# Patient Record
Sex: MALE | NOT HISPANIC OR LATINO | Employment: FULL TIME | URBAN - METROPOLITAN AREA
[De-identification: names, ages, dates, MRNs, and addresses within clinical notes are randomized per-mention and may not be internally consistent; named-entity substitution may affect disease eponyms.]

---

## 2018-11-06 ENCOUNTER — OFFICE VISIT (OUTPATIENT)
Dept: ORTHOPEDIC SURGERY | Facility: CLINIC | Age: 43
End: 2018-11-06

## 2018-11-06 VITALS — TEMPERATURE: 98.1 F | BODY MASS INDEX: 31.68 KG/M2 | WEIGHT: 178.8 LBS | HEIGHT: 63 IN

## 2018-11-06 DIAGNOSIS — S86.911A KNEE STRAIN, RIGHT, INITIAL ENCOUNTER: ICD-10-CM

## 2018-11-06 DIAGNOSIS — M25.561 ACUTE PAIN OF RIGHT KNEE: ICD-10-CM

## 2018-11-06 DIAGNOSIS — S89.91XA RIGHT KNEE INJURY, INITIAL ENCOUNTER: Primary | ICD-10-CM

## 2018-11-06 PROCEDURE — 99243 OFF/OP CNSLTJ NEW/EST LOW 30: CPT | Performed by: ORTHOPAEDIC SURGERY

## 2018-11-06 PROCEDURE — 73562 X-RAY EXAM OF KNEE 3: CPT | Performed by: ORTHOPAEDIC SURGERY

## 2018-11-06 NOTE — PROGRESS NOTES
Patient Name: Brandan Jang   YOB: 1975  Referring Primary Care Physician: Anabel Slater MD  BMI: Body mass index is 31.27 kg/m².    Chief Complaint:    Chief Complaint   Patient presents with   • Right Leg - Establish Care, Pain        Subjective:    HPI:   Brandan Jang is a pleasant 43 y.o. year old who presents today for evaluation of   Chief Complaint   Patient presents with   • Right Leg - Establish Care, Pain    (Location). Duration: This has been going on for weeks. Timing: The pain is acute. and but improving.  limped for 2 weeks.   Context or causative factors: after moving a bunch of furniture.  Remote injury 8 yrs ago moving couch.  Relieving Factors:  In the past has tried ice/heat/rest.     This problem is new to this examiner.     Medications:   Home Medications:  No current outpatient prescriptions on file prior to visit.     No current facility-administered medications on file prior to visit.      Current Medications:  Scheduled Meds:  Continuous Infusions:  No current facility-administered medications for this visit.   PRN Meds:.    I have reviewed the patient's medical history in detail and updated the computerized patient record.  Review and summarization of old records includes:    History reviewed. No pertinent past medical history.     Past Surgical History:   Procedure Laterality Date   • TONSILLECTOMY          Social History     Occupational History   • Not on file.     Social History Main Topics   • Smoking status: Never Smoker   • Smokeless tobacco: Never Used   • Alcohol use No   • Drug use: No   • Sexual activity: Defer    Social History     Social History Narrative   • No narrative on file        Family History   Problem Relation Age of Onset   • No Known Problems Mother    • Clotting disorder Father    • No Known Problems Sister    • No Known Problems Brother    • Cancer Maternal Aunt         breast   • No Known Problems Maternal Uncle    • No Known Problems Paternal  "Aunt    • No Known Problems Paternal Uncle    • No Known Problems Maternal Grandmother    • No Known Problems Maternal Grandfather    • No Known Problems Paternal Grandmother    • No Known Problems Paternal Grandfather    • Anesthesia problems Neg Hx    • Broken bones Neg Hx    • Collagen disease Neg Hx    • Diabetes Neg Hx    • Dislocations Neg Hx    • Osteoporosis Neg Hx    • Rheumatologic disease Neg Hx    • Scoliosis Neg Hx    • Severe sprains Neg Hx        ROS: 14 point review of systems was performed and all other systems were reviewed and are negative except for documented findings in HPI and today's encounter.     Allergies: No Known Allergies  Constitutional:  Denies fever, shaking or chills   Eyes:  Denies change in visual acuity   HENT:  Denies nasal congestion or sore throat   Respiratory:  Denies cough or shortness of breath   Cardiovascular:  Denies chest pain or severe LE edema   GI:  Denies abdominal pain, nausea, vomiting, bloody stools or diarrhea   Musculoskeletal:  Numbness, tingling, pain, or loss of motor function only as noted above in history of present illness.  : Denies painful urination or hematuria  Integument:  Denies rash, lesion or ulceration   Neurologic:  Denies headache or focal weakness  Endocrine:  Denies lymphadenopathy  Psych:  Denies confusion or change in mental status   Hem:  Denies active bleeding    Subjective     Objective:    Physical Exam: 43 y.o. male  Wt Readings from Last 3 Encounters:   11/06/18 81.1 kg (178 lb 12.8 oz)   03/30/15 84.4 kg (186 lb)   07/17/14 82.1 kg (181 lb)     Ht Readings from Last 3 Encounters:   11/06/18 161 cm (63.4\")   03/30/15 170.2 cm (67\")   07/17/14 170.2 cm (67\")     Body mass index is 31.27 kg/m².    Vitals:    11/06/18 0853   Temp: 98.1 °F (36.7 °C)       Vital signs reviewed.   General Appearance:    Alert, cooperative, in no acute distress                  Eyes: conjunctiva clear  ENT: external ears and nose atraumatic  CV: no " peripheral edema  Resp: normal respiratory effort  Skin: no rashes or wounds; normal turgor  Psych: mood and affect appropriate  Lymph: no nodes appreciated  Neuro: gross sensation intact  Vascular:  Palpable peripheral pulse in noted extremity  Musculoskeletal Extremities: mild knee swelling and crep, mcm neg, lig exam not suggestive, no locking      Radiology:   Imaging done today and discussed at length with the patient:    Indication: pain related symptoms,  Views: 3V AP, LAT & 40 degree PA right knee(s)   Findings: moderate arthritis  Comparison views: not available      Assessment:     ICD-10-CM ICD-9-CM   1. Right knee injury, initial encounter S89.91XA 959.7   2. Acute pain of right knee M25.561 719.46   3. Knee strain, right, initial encounter S86.911A 844.9        Procedures       Plan: Biomechanics of pertinent body area discussed.  Risks, benefits, alternatives, comparisons, and complications of accepted medicines, injections, recommendations, surgical procedures, and therapies explained and education provided in laymen's terms. The patient was given the opportunity to ask questions and they were answerved to their satisfaction.   Natural history and expected course of this patient's diagnosis discussed along with evaluation of therapies. Questions answered.  OTC analgesics as needed with dosage warning and instructions given: OTC meds: Ibuprofen  This Consult is done at the request of this patient's PCP (as listed at the top of this note)  to whom I will send this report with this rendered opinion.      11/6/2018

## 2024-02-06 ENCOUNTER — OFFICE VISIT (OUTPATIENT)
Dept: INTERNAL MEDICINE | Facility: CLINIC | Age: 49
End: 2024-02-06
Payer: COMMERCIAL

## 2024-02-06 VITALS
HEART RATE: 70 BPM | BODY MASS INDEX: 23.54 KG/M2 | TEMPERATURE: 98 F | WEIGHT: 146.5 LBS | SYSTOLIC BLOOD PRESSURE: 122 MMHG | OXYGEN SATURATION: 99 % | HEIGHT: 66 IN | DIASTOLIC BLOOD PRESSURE: 78 MMHG

## 2024-02-06 DIAGNOSIS — I34.1 MITRAL VALVE PROLAPSE: ICD-10-CM

## 2024-02-06 DIAGNOSIS — F32.0 CURRENT MILD EPISODE OF MAJOR DEPRESSIVE DISORDER WITHOUT PRIOR EPISODE: Primary | ICD-10-CM

## 2024-02-06 PROCEDURE — 99214 OFFICE O/P EST MOD 30 MIN: CPT | Performed by: NURSE PRACTITIONER

## 2024-02-06 RX ORDER — FERROUS GLUCONATE 324(37.5)
TABLET ORAL
COMMUNITY

## 2024-02-06 NOTE — PROGRESS NOTES
"Subjective   Brandan Jang is a 48 y.o. male. Patient is here today for   Chief Complaint   Patient presents with    Anxiety    Depression   .    History of Present Illness   New patient here to establish care.  Health history and questionnaire have been reviewed in its entirety. The patient's previous primary care provider was Dr. Mccollum ([facility]). Dr. Franklin Zamudio. ([facility])    Patient has allergic rhinitis. He gets allergy injections with [facility].  He also has iron deficiency anemia and is taking an iron supplement.    Patient is having some depression and anxiety. He was started on lexapro and started having racing thoughts, low libido. He had been on wellbutrin in the past and felt hyper.  He has negative thoughts, but is not suicidal. Exercise helps. Going through divorce. Sleeping okay.     C/o hearing a \"squeaky sound\" when his heart rate is elevated. It goes away when his heart rate improves. This has been for about 8-9 years. States that he had a scan of his heart and was told that he had a leaky valve.     The following portions of the patient's history were reviewed and updated as appropriate: allergies, current medications, past family history, past medical history, past social history, past surgical history and problem list.    Review of Systems    Objective   Vitals:    02/06/24 0815   BP: 122/78   Pulse: 70   Temp: 98 °F (36.7 °C)   SpO2: 99%     Body mass index is 23.66 kg/m².  Physical Exam  Vitals and nursing note reviewed.   Constitutional:       Appearance: Normal appearance. He is well-developed.   Cardiovascular:      Rate and Rhythm: Normal rate and regular rhythm.      Heart sounds: Normal heart sounds. No murmur heard.  Pulmonary:      Effort: Pulmonary effort is normal.      Breath sounds: Normal breath sounds.   Skin:     General: Skin is warm and dry.   Neurological:      Mental Status: He is alert and oriented to person, place, and time.   Psychiatric:         " Speech: Speech normal.         Behavior: Behavior normal.         Thought Content: Thought content normal.         Reviewed previous echocardiogram done in 2014 which showed mild mitral valve regurgitation and marked prolapse of the anterior and posterior mitral valve leaflets.     Assessment & Plan   Diagnoses and all orders for this visit:    1. Current mild episode of major depressive disorder without prior episode (Primary)  -     Vortioxetine HBr (Trintellix) 10 MG tablet tablet; Take 1 tablet by mouth Daily With Breakfast.  Dispense: 30 tablet; Refill: 1    2. Mitral valve prolapse  -     Adult Transthoracic Echo Complete W/ Cont if Necessary Per Protocol; Future    Other orders  -     Vortioxetine HBr (Trintellix) 5 MG tablet tablet; Take 1 tablet by mouth Daily With Breakfast.  Dispense: 7 tablet; Refill: 0  -     Vortioxetine HBr (Trintellix) 10 MG tablet tablet; Take 1 tablet by mouth Daily With Breakfast.  Dispense: 14 tablet; Refill: 0      Depression -will be started on Trintellix.  He was given a weeks worth of the 5 mg tablets to start at that good 2 weeks worth of the 10 mg dosage.  Prescription sent to his pharmacy.  Follow-up in 6 weeks    Mitral valve prolapse -since it has been 10 years since his last echocardiogram, will check another to evaluate valve

## 2024-02-23 ENCOUNTER — HOSPITAL ENCOUNTER (OUTPATIENT)
Dept: CARDIOLOGY | Facility: HOSPITAL | Age: 49
Discharge: HOME | End: 2024-02-23
Admitting: NURSE PRACTITIONER
Payer: COMMERCIAL

## 2024-02-23 VITALS
WEIGHT: 146 LBS | HEART RATE: 88 BPM | SYSTOLIC BLOOD PRESSURE: 120 MMHG | DIASTOLIC BLOOD PRESSURE: 72 MMHG | BODY MASS INDEX: 24.32 KG/M2 | HEIGHT: 65 IN

## 2024-02-23 DIAGNOSIS — I34.1 MITRAL VALVE PROLAPSE: ICD-10-CM

## 2024-02-23 LAB
AORTIC ARCH: 2.6 CM
ASCENDING AORTA: 3.3 CM
BH CV ECHO MEAS - ACS: 2.6 CM
BH CV ECHO MEAS - AO MAX PG: 10.2 MMHG
BH CV ECHO MEAS - AO MEAN PG: 5 MMHG
BH CV ECHO MEAS - AO ROOT DIAM: 3.7 CM
BH CV ECHO MEAS - AO V2 MAX: 160 CM/SEC
BH CV ECHO MEAS - AO V2 VTI: 28.1 CM
BH CV ECHO MEAS - AVA(I,D): 2.39 CM2
BH CV ECHO MEAS - EDV(CUBED): 148.9 ML
BH CV ECHO MEAS - EDV(MOD-SP2): 128 ML
BH CV ECHO MEAS - EDV(MOD-SP4): 142 ML
BH CV ECHO MEAS - EF(MOD-BP): 60.8 %
BH CV ECHO MEAS - EF(MOD-SP2): 60.9 %
BH CV ECHO MEAS - EF(MOD-SP4): 59.9 %
BH CV ECHO MEAS - ESV(CUBED): 24.4 ML
BH CV ECHO MEAS - ESV(MOD-SP2): 50 ML
BH CV ECHO MEAS - ESV(MOD-SP4): 57 ML
BH CV ECHO MEAS - FS: 45.3 %
BH CV ECHO MEAS - IVS/LVPW: 1.11 CM
BH CV ECHO MEAS - IVSD: 1 CM
BH CV ECHO MEAS - LAT PEAK E' VEL: 16.6 CM/SEC
BH CV ECHO MEAS - LV DIASTOLIC VOL/BSA (35-75): 82.1 CM2
BH CV ECHO MEAS - LV MASS(C)D: 187.3 GRAMS
BH CV ECHO MEAS - LV MAX PG: 3.2 MMHG
BH CV ECHO MEAS - LV MEAN PG: 2 MMHG
BH CV ECHO MEAS - LV SYSTOLIC VOL/BSA (12-30): 32.9 CM2
BH CV ECHO MEAS - LV V1 MAX: 88.8 CM/SEC
BH CV ECHO MEAS - LV V1 VTI: 15.6 CM
BH CV ECHO MEAS - LVIDD: 5.3 CM
BH CV ECHO MEAS - LVIDS: 2.9 CM
BH CV ECHO MEAS - LVOT AREA: 4.3 CM2
BH CV ECHO MEAS - LVOT DIAM: 2.34 CM
BH CV ECHO MEAS - LVPWD: 0.9 CM
BH CV ECHO MEAS - MED PEAK E' VEL: 10 CM/SEC
BH CV ECHO MEAS - MR MAX PG: 86.2 MMHG
BH CV ECHO MEAS - MR MAX VEL: 464.2 CM/SEC
BH CV ECHO MEAS - MV A DUR: 0.12 SEC
BH CV ECHO MEAS - MV A MAX VEL: 73.7 CM/SEC
BH CV ECHO MEAS - MV DEC SLOPE: 319 CM/SEC2
BH CV ECHO MEAS - MV DEC TIME: 0.16 SEC
BH CV ECHO MEAS - MV E MAX VEL: 105 CM/SEC
BH CV ECHO MEAS - MV E/A: 1.42
BH CV ECHO MEAS - MV MAX PG: 3.5 MMHG
BH CV ECHO MEAS - MV MEAN PG: 1.72 MMHG
BH CV ECHO MEAS - MV P1/2T: 89.7 MSEC
BH CV ECHO MEAS - MV V2 VTI: 30 CM
BH CV ECHO MEAS - MVA(P1/2T): 2.45 CM2
BH CV ECHO MEAS - MVA(VTI): 2.24 CM2
BH CV ECHO MEAS - PA ACC TIME: 0.12 SEC
BH CV ECHO MEAS - PA V2 MAX: 114 CM/SEC
BH CV ECHO MEAS - PULM A REVS DUR: 0.11 SEC
BH CV ECHO MEAS - PULM A REVS VEL: 32 CM/SEC
BH CV ECHO MEAS - PULM DIAS VEL: 46.1 CM/SEC
BH CV ECHO MEAS - PULM S/D: 1.17
BH CV ECHO MEAS - PULM SYS VEL: 53.8 CM/SEC
BH CV ECHO MEAS - QP/QS: 0.71
BH CV ECHO MEAS - RAP SYSTOLE: 3 MMHG
BH CV ECHO MEAS - RV MAX PG: 2.6 MMHG
BH CV ECHO MEAS - RV V1 MAX: 80.6 CM/SEC
BH CV ECHO MEAS - RV V1 VTI: 15.4 CM
BH CV ECHO MEAS - RVOT DIAM: 1.98 CM
BH CV ECHO MEAS - RVSP: 22.1 MMHG
BH CV ECHO MEAS - SI(MOD-SP2): 45.1 ML/M2
BH CV ECHO MEAS - SI(MOD-SP4): 49.1 ML/M2
BH CV ECHO MEAS - SUP REN AO DIAM: 2.1 CM
BH CV ECHO MEAS - SV(LVOT): 67.3 ML
BH CV ECHO MEAS - SV(MOD-SP2): 78 ML
BH CV ECHO MEAS - SV(MOD-SP4): 85 ML
BH CV ECHO MEAS - SV(RVOT): 47.6 ML
BH CV ECHO MEAS - TAPSE (>1.6): 2.3 CM
BH CV ECHO MEAS - TR MAX PG: 19.1 MMHG
BH CV ECHO MEAS - TR MAX VEL: 218.5 CM/SEC
BH CV ECHO MEASUREMENTS AVERAGE E/E' RATIO: 7.89
BH CV XLRA - RV BASE: 3.1 CM
BH CV XLRA - RV LENGTH: 7.4 CM
BH CV XLRA - RV MID: 2.39 CM
BH CV XLRA - TDI S': 17.8 CM/SEC
LEFT ATRIUM VOLUME INDEX: 36.3 ML/M2
SINUS: 3.4 CM
STJ: 2.8 CM

## 2024-02-23 PROCEDURE — 93306 TTE W/DOPPLER COMPLETE: CPT

## 2024-02-26 ENCOUNTER — PATIENT MESSAGE (OUTPATIENT)
Dept: INTERNAL MEDICINE | Facility: CLINIC | Age: 49
End: 2024-02-26
Payer: COMMERCIAL

## 2024-02-27 RX ORDER — VILAZODONE HYDROCHLORIDE 20 MG/1
20 TABLET ORAL DAILY
Qty: 30 TABLET | Refills: 1 | Status: SHIPPED | OUTPATIENT
Start: 2024-02-27

## 2024-03-25 ENCOUNTER — OFFICE VISIT (OUTPATIENT)
Dept: INTERNAL MEDICINE | Facility: CLINIC | Age: 49
End: 2024-03-25
Payer: COMMERCIAL

## 2024-03-25 VITALS
BODY MASS INDEX: 24.66 KG/M2 | HEIGHT: 65 IN | SYSTOLIC BLOOD PRESSURE: 120 MMHG | WEIGHT: 148 LBS | OXYGEN SATURATION: 100 % | HEART RATE: 74 BPM | DIASTOLIC BLOOD PRESSURE: 78 MMHG | TEMPERATURE: 97.9 F

## 2024-03-25 DIAGNOSIS — L98.9 SKIN LESION: ICD-10-CM

## 2024-03-25 DIAGNOSIS — F33.0 MAJOR DEPRESSIVE DISORDER, RECURRENT, MILD: Primary | ICD-10-CM

## 2024-03-25 PROCEDURE — 99213 OFFICE O/P EST LOW 20 MIN: CPT | Performed by: NURSE PRACTITIONER

## 2024-03-25 NOTE — PROGRESS NOTES
Subjective   Brandan Jang is a 48 y.o. male. Patient is here today for   Chief Complaint   Patient presents with    Depression    Follow-up     Patient is here for a 6 week follow up on VIIBRYD, patient discontinued after having intrusive thoughts.    .    History of Present Illness   Patient is here to follow up on depression. He was prescribed trintellix which wasn't covered on his insurance plan. He was then started on viibryd, but stopped taking it due to having intrusive thoughts. He has also tried lexapro and wellbutrin in the past. He doesn't feel like he needs any medication at this time. He is exercising, eating healthier, getting sunlight.     Patient has 2 lesions on his legs that he would like to have evaluated. They have been there awhile.      The following portions of the patient's history were reviewed and updated as appropriate: allergies, current medications, past family history, past medical history, past social history, past surgical history and problem list.    Review of Systems    Objective   Vitals:    03/25/24 1043   BP: 120/78   Pulse: 74   Temp: 97.9 °F (36.6 °C)   SpO2: 100%     Body mass index is 24.63 kg/m².  Physical Exam  Vitals and nursing note reviewed.   Constitutional:       Appearance: Normal appearance. He is well-developed.   Cardiovascular:      Rate and Rhythm: Normal rate and regular rhythm.      Heart sounds: Normal heart sounds.   Pulmonary:      Effort: Pulmonary effort is normal.      Breath sounds: Normal breath sounds.   Skin:     General: Skin is warm and dry.   Neurological:      Mental Status: He is alert and oriented to person, place, and time.   Psychiatric:         Speech: Speech normal.         Behavior: Behavior normal.         Thought Content: Thought content normal.         Assessment & Plan   Diagnoses and all orders for this visit:    1. Major depressive disorder, recurrent, mild (Primary)    2. Skin lesion  -     Ambulatory Referral to  Dermatology      Depression - no medications needed at this time. Patient will follow up if his symptoms do not continue to improve    Skin lesion - refer to derm

## 2024-06-25 RX ORDER — VILAZODONE HYDROCHLORIDE 20 MG/1
20 TABLET ORAL DAILY
Qty: 90 TABLET | Refills: 1 | Status: SHIPPED | OUTPATIENT
Start: 2024-06-25

## 2024-08-01 RX ORDER — VILAZODONE HYDROCHLORIDE 20 MG/1
20 TABLET ORAL DAILY
Qty: 30 TABLET | OUTPATIENT
Start: 2024-08-01

## 2024-08-25 ENCOUNTER — PATIENT MESSAGE (OUTPATIENT)
Dept: INTERNAL MEDICINE | Facility: CLINIC | Age: 49
End: 2024-08-25
Payer: COMMERCIAL

## 2024-09-23 DIAGNOSIS — D50.8 OTHER IRON DEFICIENCY ANEMIA: ICD-10-CM

## 2024-09-23 DIAGNOSIS — R35.1 NOCTURIA: Primary | ICD-10-CM

## 2024-09-23 DIAGNOSIS — Z00.00 HEALTHCARE MAINTENANCE: ICD-10-CM

## 2024-09-23 DIAGNOSIS — E55.9 VITAMIN D DEFICIENCY: ICD-10-CM

## 2024-09-24 LAB
25(OH)D3+25(OH)D2 SERPL-MCNC: 33.2 NG/ML (ref 30–100)
ALBUMIN SERPL-MCNC: 4.7 G/DL (ref 3.5–5.2)
ALBUMIN/GLOB SERPL: 2.9 G/DL
ALP SERPL-CCNC: 66 U/L (ref 39–117)
ALT SERPL-CCNC: 11 U/L (ref 1–41)
AST SERPL-CCNC: 21 U/L (ref 1–40)
BASOPHILS # BLD AUTO: 0.03 10*3/MM3 (ref 0–0.2)
BASOPHILS NFR BLD AUTO: 0.7 % (ref 0–1.5)
BILIRUB SERPL-MCNC: 0.8 MG/DL (ref 0–1.2)
BUN SERPL-MCNC: 10 MG/DL (ref 6–20)
BUN/CREAT SERPL: 12.3 (ref 7–25)
CALCIUM SERPL-MCNC: 9.5 MG/DL (ref 8.6–10.5)
CHLORIDE SERPL-SCNC: 99 MMOL/L (ref 98–107)
CHOLEST SERPL-MCNC: 216 MG/DL (ref 0–200)
CO2 SERPL-SCNC: 23.6 MMOL/L (ref 22–29)
CREAT SERPL-MCNC: 0.81 MG/DL (ref 0.76–1.27)
EGFRCR SERPLBLD CKD-EPI 2021: 107.4 ML/MIN/1.73
EOSINOPHIL # BLD AUTO: 0.04 10*3/MM3 (ref 0–0.4)
EOSINOPHIL NFR BLD AUTO: 1 % (ref 0.3–6.2)
ERYTHROCYTE [DISTWIDTH] IN BLOOD BY AUTOMATED COUNT: 14.6 % (ref 12.3–15.4)
FERRITIN SERPL-MCNC: 14.3 NG/ML (ref 30–400)
GLOBULIN SER CALC-MCNC: 1.6 GM/DL
GLUCOSE SERPL-MCNC: 81 MG/DL (ref 65–99)
HBA1C MFR BLD: 5 % (ref 4.8–5.6)
HCT VFR BLD AUTO: 41.4 % (ref 37.5–51)
HCV IGG SERPL QL IA: NON REACTIVE
HDLC SERPL-MCNC: 60 MG/DL (ref 40–60)
HGB BLD-MCNC: 13.3 G/DL (ref 13–17.7)
IMM GRANULOCYTES # BLD AUTO: 0.01 10*3/MM3 (ref 0–0.05)
IMM GRANULOCYTES NFR BLD AUTO: 0.2 % (ref 0–0.5)
IRON SATN MFR SERPL: 10 % (ref 20–50)
IRON SERPL-MCNC: 61 MCG/DL (ref 59–158)
LDLC SERPL CALC-MCNC: 145 MG/DL (ref 0–100)
LDLC/HDLC SERPL: 2.39 {RATIO}
LYMPHOCYTES # BLD AUTO: 1.46 10*3/MM3 (ref 0.7–3.1)
LYMPHOCYTES NFR BLD AUTO: 35.2 % (ref 19.6–45.3)
MCH RBC QN AUTO: 26.9 PG (ref 26.6–33)
MCHC RBC AUTO-ENTMCNC: 32.1 G/DL (ref 31.5–35.7)
MCV RBC AUTO: 83.8 FL (ref 79–97)
MONOCYTES # BLD AUTO: 0.45 10*3/MM3 (ref 0.1–0.9)
MONOCYTES NFR BLD AUTO: 10.8 % (ref 5–12)
NEUTROPHILS # BLD AUTO: 2.16 10*3/MM3 (ref 1.7–7)
NEUTROPHILS NFR BLD AUTO: 52.1 % (ref 42.7–76)
NRBC BLD AUTO-RTO: 0 /100 WBC (ref 0–0.2)
PLATELET # BLD AUTO: 335 10*3/MM3 (ref 140–450)
POTASSIUM SERPL-SCNC: 4.9 MMOL/L (ref 3.5–5.2)
PROT SERPL-MCNC: 6.3 G/DL (ref 6–8.5)
PSA SERPL-MCNC: 2.4 NG/ML (ref 0–4)
RBC # BLD AUTO: 4.94 10*6/MM3 (ref 4.14–5.8)
SODIUM SERPL-SCNC: 134 MMOL/L (ref 136–145)
TIBC SERPL-MCNC: 595 MCG/DL
TRIGL SERPL-MCNC: 62 MG/DL (ref 0–150)
TSH SERPL DL<=0.005 MIU/L-ACNC: 2.1 UIU/ML (ref 0.27–4.2)
UIBC SERPL-MCNC: 534 MCG/DL (ref 112–346)
VLDLC SERPL CALC-MCNC: 11 MG/DL (ref 5–40)
WBC # BLD AUTO: 4.15 10*3/MM3 (ref 3.4–10.8)

## 2024-09-30 ENCOUNTER — OFFICE VISIT (OUTPATIENT)
Dept: INTERNAL MEDICINE | Facility: CLINIC | Age: 49
End: 2024-09-30
Payer: COMMERCIAL

## 2024-09-30 VITALS
WEIGHT: 159 LBS | BODY MASS INDEX: 26.49 KG/M2 | HEIGHT: 65 IN | HEART RATE: 69 BPM | OXYGEN SATURATION: 99 % | DIASTOLIC BLOOD PRESSURE: 80 MMHG | SYSTOLIC BLOOD PRESSURE: 110 MMHG | TEMPERATURE: 98 F

## 2024-09-30 DIAGNOSIS — Z00.00 HEALTHCARE MAINTENANCE: Primary | ICD-10-CM

## 2024-09-30 PROCEDURE — 99396 PREV VISIT EST AGE 40-64: CPT | Performed by: NURSE PRACTITIONER

## 2024-09-30 NOTE — PROGRESS NOTES
"Subjective   Brandan Jang is a 49 y.o. male and is here for a comprehensive physical exam. The patient reports no problems.    Do you take any herbs or supplements that were not prescribed by a doctor?  Vit B12     History:  Date last PSA:  9/23/2024    The following portions of the patient's history were reviewed and updated as appropriate: allergies, current medications, past family history, past medical history, past social history, past surgical history and problem list.    Review of Systems  Do you have pain that bothers you in your daily life? no  A comprehensive review of systems was negative.    Objective   /80   Pulse 69   Temp 98 °F (36.7 °C)   Ht 165.1 cm (65\")   Wt 72.1 kg (159 lb)   SpO2 99%   BMI 26.46 kg/m²     General Appearance:    Alert, cooperative, no distress, appears stated age   Head:    Normocephalic, without obvious abnormality, atraumatic   Eyes:    PERRL, conjunctiva/corneas clear, EOM's intact, both eyes   Ears:    Normal TM's and external ear canals, both ears   Nose:   Nares normal, septum midline, mucosa normal, no drainage    or sinus tenderness   Throat:   Lips, mucosa, and tongue normal; teeth and gums normal   Neck:   Supple, symmetrical, trachea midline, no adenopathy;     thyroid:  no enlargement/tenderness/nodules   Back:     Symmetric, no curvature, ROM normal, no CVA tenderness   Lungs:     Clear to auscultation bilaterally, respirations unlabored   Chest Wall:    No tenderness or deformity    Heart:    Regular rate and rhythm, S1 and S2 normal, no murmur       Abdomen:     Soft, non-tender, bowel sounds active all four quadrants,     no masses, no organomegaly           Extremities:   Extremities normal, atraumatic, no cyanosis or edema   Pulses:   2+ and symmetric all extremities   Skin:   Skin color, texture, turgor normal, no rashes or lesions   Lymph nodes:   Cervical, supraclavicular, and axillary nodes normal   Neurologic:   Grossly intact, normal " strength, sensation and reflexes     throughout     Orders Only on 09/23/2024   Component Date Value Ref Range Status    Hep C Virus Ab 09/23/2024 Non Reactive  Non Reactive Final    Comment: HCV antibody alone does not differentiate between previously  resolved infection and active infection. Equivocal and Reactive  HCV antibody results should be followed up with an HCV RNA test  to support the diagnosis of active HCV infection.      Total Cholesterol 09/23/2024 216 (H)  0 - 200 mg/dL Final    Comment: Cholesterol Reference Ranges  ([place] Department of Health and Human Services ATP III  Classifications)  Desirable          <200 mg/dL  Borderline High    200-239 mg/dL  High Risk          >240 mg/dL  Triglyceride Reference Ranges  (U.S. Department of Health and Human Services ATP III  Classifications)  Normal           <150 mg/dL  Borderline High  150-199 mg/dL  High             200-499 mg/dL  Very High        >500 mg/dL  HDL Reference Ranges  (U.S. Department of Health and Human Services ATP III  Classifications)  Low     <40 mg/dl (major risk factor for CHD)  High    >60 mg/dl ('negative' risk factor for CHD)  LDL Reference Ranges  (U.S. Department of Health and Human Services ATP III  Classifications)  Optimal          <100 mg/dL  Near Optimal     100-129 mg/dL  Borderline High  130-159 mg/dL  High             160-189 mg/dL  Very High        >189 mg/dL      Triglycerides 09/23/2024 62  0 - 150 mg/dL Final    HDL Cholesterol 09/23/2024 60  40 - 60 mg/dL Final    VLDL Cholesterol Chuck 09/23/2024 11  5 - 40 mg/dL Final    LDL Chol Calc (NIH) 09/23/2024 145 (H)  0 - 100 mg/dL Final    LDL/HDL RATIO 09/23/2024 2.39   Final    Glucose 09/23/2024 81  65 - 99 mg/dL Final    BUN 09/23/2024 10  6 - 20 mg/dL Final    Creatinine 09/23/2024 0.81  0.76 - 1.27 mg/dL Final    EGFR Result 09/23/2024 107.4  >60.0 mL/min/1.73 Final    Comment: GFR Normal >60  Chronic Kidney Disease <60  Kidney Failure <15      BUN/Creatinine Ratio  09/23/2024 12.3  7.0 - 25.0 Final    Sodium 09/23/2024 134 (L)  136 - 145 mmol/L Final    Potassium 09/23/2024 4.9  3.5 - 5.2 mmol/L Final    Chloride 09/23/2024 99  98 - 107 mmol/L Final    Total CO2 09/23/2024 23.6  22.0 - 29.0 mmol/L Final    Calcium 09/23/2024 9.5  8.6 - 10.5 mg/dL Final    Total Protein 09/23/2024 6.3  6.0 - 8.5 g/dL Final    Albumin 09/23/2024 4.7  3.5 - 5.2 g/dL Final    Globulin 09/23/2024 1.6  gm/dL Final    A/G Ratio 09/23/2024 2.9  g/dL Final    Total Bilirubin 09/23/2024 0.8  0.0 - 1.2 mg/dL Final    Alkaline Phosphatase 09/23/2024 66  39 - 117 U/L Final    AST (SGOT) 09/23/2024 21  1 - 40 U/L Final    ALT (SGPT) 09/23/2024 11  1 - 41 U/L Final    WBC 09/23/2024 4.15  3.40 - 10.80 10*3/mm3 Final    RBC 09/23/2024 4.94  4.14 - 5.80 10*6/mm3 Final    Hemoglobin 09/23/2024 13.3  13.0 - 17.7 g/dL Final    Hematocrit 09/23/2024 41.4  37.5 - 51.0 % Final    MCV 09/23/2024 83.8  79.0 - 97.0 fL Final    MCH 09/23/2024 26.9  26.6 - 33.0 pg Final    MCHC 09/23/2024 32.1  31.5 - 35.7 g/dL Final    RDW 09/23/2024 14.6  12.3 - 15.4 % Final    Platelets 09/23/2024 335  140 - 450 10*3/mm3 Final    Neutrophil Rel % 09/23/2024 52.1  42.7 - 76.0 % Final    Lymphocyte Rel % 09/23/2024 35.2  19.6 - 45.3 % Final    Monocyte Rel % 09/23/2024 10.8  5.0 - 12.0 % Final    Eosinophil Rel % 09/23/2024 1.0  0.3 - 6.2 % Final    Basophil Rel % 09/23/2024 0.7  0.0 - 1.5 % Final    Neutrophils Absolute 09/23/2024 2.16  1.70 - 7.00 10*3/mm3 Final    Lymphocytes Absolute 09/23/2024 1.46  0.70 - 3.10 10*3/mm3 Final    Monocytes Absolute 09/23/2024 0.45  0.10 - 0.90 10*3/mm3 Final    Eosinophils Absolute 09/23/2024 0.04  0.00 - 0.40 10*3/mm3 Final    Basophils Absolute 09/23/2024 0.03  0.00 - 0.20 10*3/mm3 Final    Immature Granulocyte Rel % 09/23/2024 0.2  0.0 - 0.5 % Final    Immature Grans Absolute 09/23/2024 0.01  0.00 - 0.05 10*3/mm3 Final    nRBC 09/23/2024 0.0  0.0 - 0.2 /100 WBC Final    TSH 09/23/2024 2.100   0.270 - 4.200 uIU/mL Final    Hemoglobin A1C 09/23/2024 5.00  4.80 - 5.60 % Final    Comment: Hemoglobin A1C Ranges:  Increased Risk for Diabetes  5.7% to 6.4%  Diabetes                     >= 6.5%  Diabetic Goal                < 7.0%      25 Hydroxy, Vitamin D 09/23/2024 33.2  30.0 - 100.0 ng/ml Final    Comment: Reference Range for Total Vitamin D 25(OH)  Deficiency <20.0 ng/mL  Insufficiency 21-29 ng/mL  Sufficiency  ng/mL  Toxicity >100 ng/ml      Ferritin 09/23/2024 14.30 (L)  30.00 - 400.00 ng/mL Final    Results may be falsely decreased if patient taking Biotin.    PSA 09/23/2024 2.400  0.000 - 4.000 ng/mL Final    Comment: Results may be falsely decreased if patient taking Biotin.  Testing Method: [employer] Electrochemiluminescence  Immunoassay(ECLIA)  Values obtained with different assay methods or kits cannot  be used interchangeably.      TIBC 09/23/2024 595  mcg/dL Final    UIBC 09/23/2024 534 (H)  112 - 346 mcg/dL Final    Iron 09/23/2024 61  59 - 158 mcg/dL Final    Iron Saturation 09/23/2024 10 (L)  20 - 50 % Final     Reviewed labs with patient.      The 10-year ASCVD risk score (Dona DK, et al., 2019) is: 2.4%    Values used to calculate the score:      Age: 49 years      Sex: Male      Is Non- : No      Diabetic: No      Tobacco smoker: No      Systolic Blood Pressure: 110 mmHg      Is BP treated: No      HDL Cholesterol: 60 mg/dL      Total Cholesterol: 216 mg/dL    Assessment & Plan   Healthy male exam.      1. Diagnoses and all orders for this visit:    1. Healthcare maintenance (Primary)    Iron deficiency - Since patient is not anemic, he is wanting to improve his iron intake in his diet    2. Patient Counseling:  --Nutrition: Stressed importance of moderation in sodium/caffeine intake, saturated fat and cholesterol, caloric balance, sufficient intake of fresh fruits, vegetables, fiber, calcium, iron.  --Exercise: Stressed the importance of regular  exercise.   --Dental health: Discussed importance of regular tooth brushing, flossing, and dental visits.  --Immunizations reviewed. Flu and TdaP today.    3. Discussed the patient's BMI with him.  The BMI is in the acceptable range  BMI is >= 25 and <30. (Overweight) The following options were offered after discussion;: exercise counseling/recommendations and nutrition counseling/recommendations       4. Follow up next physical in 1 year

## 2024-10-01 PROCEDURE — 90472 IMMUNIZATION ADMIN EACH ADD: CPT | Performed by: NURSE PRACTITIONER

## 2024-10-01 PROCEDURE — 90715 TDAP VACCINE 7 YRS/> IM: CPT | Performed by: NURSE PRACTITIONER

## 2024-10-01 PROCEDURE — 90471 IMMUNIZATION ADMIN: CPT | Performed by: NURSE PRACTITIONER

## 2024-10-01 PROCEDURE — 90656 IIV3 VACC NO PRSV 0.5 ML IM: CPT | Performed by: NURSE PRACTITIONER

## 2024-10-31 ENCOUNTER — TELEPHONE (OUTPATIENT)
Dept: INTERNAL MEDICINE | Facility: CLINIC | Age: 49
End: 2024-10-31

## 2024-10-31 DIAGNOSIS — A60.00 GENITAL HERPES SIMPLEX, UNSPECIFIED SITE: Primary | ICD-10-CM

## 2024-10-31 RX ORDER — VALACYCLOVIR HYDROCHLORIDE 1 G/1
1000 TABLET, FILM COATED ORAL DAILY
Qty: 90 TABLET | Refills: 3 | Status: SHIPPED | OUTPATIENT
Start: 2024-10-31

## 2024-11-19 DIAGNOSIS — A60.02 HERPES GENITALIS IN MEN: Primary | ICD-10-CM

## 2024-12-03 DIAGNOSIS — A60.02 HERPES GENITALIS IN MEN: Primary | ICD-10-CM

## 2024-12-09 DIAGNOSIS — A60.00 GENITAL HERPES SIMPLEX, UNSPECIFIED SITE: Primary | ICD-10-CM

## 2025-02-07 DIAGNOSIS — A60.00 GENITAL HERPES SIMPLEX, UNSPECIFIED SITE: ICD-10-CM

## 2025-02-07 RX ORDER — VALACYCLOVIR HYDROCHLORIDE 1 G/1
1000 TABLET, FILM COATED ORAL DAILY
Qty: 90 TABLET | Refills: 0 | Status: SHIPPED | OUTPATIENT
Start: 2025-02-07

## 2025-05-02 DIAGNOSIS — Z00.00 HEALTHCARE MAINTENANCE: Primary | ICD-10-CM

## 2025-05-08 ENCOUNTER — LAB (OUTPATIENT)
Dept: LAB | Facility: HOSPITAL | Age: 50
End: 2025-05-08
Payer: COMMERCIAL

## 2025-05-08 PROCEDURE — 86765 RUBEOLA ANTIBODY: CPT | Performed by: NURSE PRACTITIONER

## 2025-05-08 PROCEDURE — 86762 RUBELLA ANTIBODY: CPT | Performed by: NURSE PRACTITIONER

## 2025-05-08 PROCEDURE — 86735 MUMPS ANTIBODY: CPT | Performed by: NURSE PRACTITIONER

## 2025-05-09 LAB
MEV IGG SER IA-ACNC: 174 AU/ML
MUV IGG SER IA-ACNC: 91.6 AU/ML
RUBV IGG SERPL IA-ACNC: 11.8 INDEX

## 2025-06-09 ENCOUNTER — TELEPHONE (OUTPATIENT)
Dept: INTERNAL MEDICINE | Facility: CLINIC | Age: 50
End: 2025-06-09
Payer: COMMERCIAL

## 2025-06-09 DIAGNOSIS — Z29.89 ALTITUDE SICKNESS PREVENTATIVE MEASURES: Primary | ICD-10-CM

## 2025-06-09 RX ORDER — ACETAZOLAMIDE 125 MG/1
125 TABLET ORAL 2 TIMES DAILY
Qty: 30 TABLET | Refills: 0 | Status: SHIPPED | OUTPATIENT
Start: 2025-06-09